# Patient Record
Sex: FEMALE | Race: WHITE | NOT HISPANIC OR LATINO | Employment: FULL TIME | URBAN - METROPOLITAN AREA
[De-identification: names, ages, dates, MRNs, and addresses within clinical notes are randomized per-mention and may not be internally consistent; named-entity substitution may affect disease eponyms.]

---

## 2020-02-23 ENCOUNTER — OFFICE VISIT (OUTPATIENT)
Dept: URGENT CARE | Facility: CLINIC | Age: 25
End: 2020-02-23
Payer: COMMERCIAL

## 2020-02-23 VITALS
RESPIRATION RATE: 18 BRPM | BODY MASS INDEX: 27.93 KG/M2 | HEIGHT: 63 IN | WEIGHT: 157.6 LBS | TEMPERATURE: 98.7 F | HEART RATE: 75 BPM | DIASTOLIC BLOOD PRESSURE: 70 MMHG | OXYGEN SATURATION: 98 % | SYSTOLIC BLOOD PRESSURE: 124 MMHG

## 2020-02-23 DIAGNOSIS — J02.9 SORE THROAT: ICD-10-CM

## 2020-02-23 DIAGNOSIS — J06.9 VIRAL UPPER RESPIRATORY TRACT INFECTION: Primary | ICD-10-CM

## 2020-02-23 LAB — S PYO AG THROAT QL: NEGATIVE

## 2020-02-23 PROCEDURE — 87880 STREP A ASSAY W/OPTIC: CPT | Performed by: PHYSICIAN ASSISTANT

## 2020-02-23 PROCEDURE — 87070 CULTURE OTHR SPECIMN AEROBIC: CPT | Performed by: PHYSICIAN ASSISTANT

## 2020-02-23 PROCEDURE — 99213 OFFICE O/P EST LOW 20 MIN: CPT | Performed by: PHYSICIAN ASSISTANT

## 2020-02-23 NOTE — PATIENT INSTRUCTIONS
Viral upper respiratory infection with PND causing sore throat and cough  Recommend sudafed for cough and PND  Rest, fluids and supportive care  May benefit from a cool mist humidifier on night stand  Tylenol/ibuprofen as needed for pain/fever  Follow up with PCP in 3-5 days  Proceed to  ER if symptoms worsen  Upper Respiratory Infection   WHAT YOU NEED TO KNOW:   An upper respiratory infection is also called the common cold  It is an infection that can affect your nose, throat, ears, and sinuses  For healthy people, the common cold is usually not serious and does not need special treatment  Cold symptoms are usually worst for the first 3 to 5 days  Most people get better in 7 to 14 days  You may continue to cough for 2 to 3 weeks  Colds are caused by viruses and do not get better with antibiotics  DISCHARGE INSTRUCTIONS:   Return to the emergency department if:   · You have chest pain or trouble breathing  Contact your healthcare provider if:   · You have a fever over 102ºF (39°C)  · Your sore throat gets worse or you see white or yellow spots in your throat  · Your symptoms get worse after 3 to 5 days or your cold is not better in 14 days  · You have a rash anywhere on your skin  · You have large, tender lumps in your neck  · You have thick, green or yellow drainage from your nose  · You cough up thick yellow, green, or bloody mucus  · You have vomiting for more than 24 hours and cannot keep fluids down  · You have a bad earache  · You have questions or concerns about your condition or care  Medicines: You may need any of the following:  · Decongestants  help reduce nasal congestion and help you breathe more easily  If you take decongestant pills, they may make you feel restless or cause problems with your sleep  Do not use decongestant sprays for more than a few days  · Cough suppressants  help reduce coughing   Ask your healthcare provider which type of cough medicine is best for you  · NSAIDs , such as ibuprofen, help decrease swelling, pain, and fever  NSAIDs can cause stomach bleeding or kidney problems in certain people  If you take blood thinner medicine, always ask your healthcare provider if NSAIDs are safe for you  Always read the medicine label and follow directions  · Acetaminophen  decreases pain and fever  It is available without a doctor's order  Ask how much to take and how often to take it  Follow directions  Read the labels of all other medicines you are using to see if they also contain acetaminophen, or ask your doctor or pharmacist  Acetaminophen can cause liver damage if not taken correctly  Do not use more than 4 grams (4,000 milligrams) total of acetaminophen in one day  · Take your medicine as directed  Contact your healthcare provider if you think your medicine is not helping or if you have side effects  Tell him or her if you are allergic to any medicine  Keep a list of the medicines, vitamins, and herbs you take  Include the amounts, and when and why you take them  Bring the list or the pill bottles to follow-up visits  Carry your medicine list with you in case of an emergency  Follow up with your healthcare provider as directed:  Write down your questions so you remember to ask them during your visits  Self-care:   · Rest as much as possible  Slowly start to do more each day  · Drink more liquids as directed  Liquids will help thin and loosen mucus so you can cough it up  Liquids will also help prevent dehydration  Liquids that help prevent dehydration include water, fruit juice, and broth  Do not drink liquids that contain caffeine  Caffeine can increase your risk for dehydration  Ask your healthcare provider how much liquid to drink each day  · Soothe a sore throat  Gargle with warm salt water  This helps your sore throat feel better  Make salt water by dissolving ¼ teaspoon salt in 1 cup warm water   You may also suck on hard candy or throat lozenges  You may use a sore throat spray  · Use a humidifier or vaporizer  Use a cool mist humidifier or a vaporizer to increase air moisture in your home  This may make it easier for you to breathe and help decrease your cough  · Use saline nasal drops as directed  These help relieve congestion  · Apply petroleum-based jelly around the outside of your nostrils  This can decrease irritation from blowing your nose  · Do not smoke  Nicotine and other chemicals in cigarettes and cigars can make your symptoms worse  They can also cause infections such as bronchitis or pneumonia  Ask your healthcare provider for information if you currently smoke and need help to quit  E-cigarettes or smokeless tobacco still contain nicotine  Talk to your healthcare provider before you use these products  Prevent spreading your cold to others:   · Try to stay away from other people during the first 2 to 3 days of your cold when it is more easily spread  · Do not share food or drinks  · Do not share hand towels with household members  · Wash your hands often, especially after you blow your nose  Turn away from other people and cover your mouth and nose with a tissue when you sneeze or cough  © 2017 [facility] Information is for End User's use only and may not be sold, redistributed or otherwise used for commercial purposes  All illustrations and images included in CareNotes® are the copyrighted property of A [employer]  or [employer]  The above information is an  only  It is not intended as medical advice for individual conditions or treatments  Talk to your doctor, nurse or pharmacist before following any medical regimen to see if it is safe and effective for you

## 2020-02-23 NOTE — PROGRESS NOTES
[facility]    NAME: Jl Herron is a 22 y o  female  : 1995    MRN: [id number]  DATE: 2020  TIME: 8:23 PM    Assessment and Plan   Viral upper respiratory tract infection [J06 9]  1  Viral upper respiratory tract infection     2  Sore throat  POCT rapid strepA    Throat culture         Patient Instructions   Viral upper respiratory infection with PND causing sore throat and cough  Recommend sudafed for cough and PND  Rest, fluids and supportive care  May benefit from a cool mist humidifier on night stand  Tylenol/ibuprofen as needed for pain/fever  Follow up with PCP in 3-5 days  Proceed to  ER if symptoms worsen  Chief Complaint     Chief Complaint   Patient presents with   Camille Sang Like Symptoms     Started Wednesday with sore throat and swollen glands  Today has PND with nasal congestion and occ  congested cough  No fever, ear pain  Took  OTC cough med today   Sore Throat         History of Present Illness       Sae Pimentel is a 27-year-old female who presents to the clinic complaining of sore throat x5 days  She states that has progressively worsened over those 5 days  She is also complaining of nasal congestion a dry the cough  She denies any fever, chills, ear pain, nausea, vomiting, sinus pain or pressure, shortness of breath, or chest pain  She has not taken any over-the-counter medication and denies any recent sick contacts  Review of Systems   Review of Systems   Constitutional: Negative for chills and fever  HENT: Positive for congestion and sore throat  Negative for ear pain, sinus pressure and sinus pain  Respiratory: Positive for cough  Negative for chest tightness and shortness of breath  Cardiovascular: Negative for chest pain  Gastrointestinal: Negative for nausea and vomiting  Musculoskeletal: Negative for myalgias  Neurological: Negative for headaches       Current Medications       Current Outpatient Medications:     Levonorgestrel-Ethinyl Estrad (LARISSIA PO), Take by mouth daily, Disp: , Rfl:     Current Allergies     Allergies as of 02/23/2020    (No Known Allergies)            The following portions of the patient's history were reviewed and updated as appropriate: allergies, current medications, past family history, past medical history, past social history, past surgical history and problem list      Past Medical History:   Diagnosis Date    No known health problems        Past Surgical History:   Procedure Laterality Date    LAPAROSCOPY      WISDOM TOOTH EXTRACTION         History reviewed  No pertinent family history  Medications have been verified  Objective   /70   Pulse 75   Temp 98 7 °F (37 1 °C)   Resp 18   Ht 5' 3" (1 6 m)   Wt 71 5 kg (157 lb 9 6 oz)   LMP 02/04/2020 (Exact Date)   SpO2 98%   BMI 27 92 kg/m²        Physical Exam     Physical Exam   Constitutional: She is oriented to person, place, and time  She appears well-developed and well-nourished  No distress  HENT:   Right Ear: Tympanic membrane, external ear and ear canal normal    Left Ear: Tympanic membrane, external ear and ear canal normal    Nose: Mucosal edema present  Right sinus exhibits no maxillary sinus tenderness and no frontal sinus tenderness  Left sinus exhibits no maxillary sinus tenderness and no frontal sinus tenderness  Mouth/Throat: Uvula is midline and mucous membranes are normal  Posterior oropharyngeal erythema present  No oropharyngeal exudate or posterior oropharyngeal edema  Cardiovascular: Normal rate, regular rhythm and normal heart sounds  Pulmonary/Chest: Effort normal and breath sounds normal  No stridor  She has no wheezes  She has no rales  Lymphadenopathy:     She has no cervical adenopathy  Neurological: She is alert and oriented to person, place, and time  Psychiatric: She has a normal mood and affect  Nursing note and vitals reviewed

## 2020-02-25 LAB — BACTERIA THROAT CULT: NORMAL

## 2020-07-10 ENCOUNTER — OFFICE VISIT (OUTPATIENT)
Dept: GASTROENTEROLOGY | Facility: CLINIC | Age: 25
End: 2020-07-10
Payer: COMMERCIAL

## 2020-07-10 VITALS — BODY MASS INDEX: 28.88 KG/M2 | HEIGHT: 63 IN | TEMPERATURE: 99.9 F | HEART RATE: 90 BPM | WEIGHT: 163 LBS

## 2020-07-10 DIAGNOSIS — R10.30 LOWER ABDOMINAL PAIN: Primary | ICD-10-CM

## 2020-07-10 DIAGNOSIS — R19.7 DIARRHEA, UNSPECIFIED TYPE: ICD-10-CM

## 2020-07-10 PROCEDURE — 99203 OFFICE O/P NEW LOW 30 MIN: CPT | Performed by: INTERNAL MEDICINE

## 2020-07-10 NOTE — LETTER
July 10, 2020     Referral [address] [place] [id number]    Patient: Dipti Bradford   YOB: 1995   Date of Visit: 7/10/2020       Dear Dr Wendie Bianchi:    Thank you for referring Dipti Bradford to me for evaluation  Below are my notes for this consultation  If you have questions, please do not hesitate to call me  I look forward to following your patient along with you  Sincerely,        Katrina Joseph MD        CC: SILVINA Martinez MD  7/10/2020  1:38 PM  Addendum  [facility] [facility] - Outpatient Consultation  Dipti Bradford 22 y o  female MRN: [id number]  Encounter: [id number]          ASSESSMENT AND PLAN:      1  Abdominal pain  - may be due to IBS (irritable bowel syndrome) vs visceral hypersensitivity vs endometriosis  - start IBgard (coated peppermint oil) 1-2 capsule daily  - avoid NSAIDs  - we will request records from [facility]    2  Diarrhea  - check blood and stool tests (CBC, CMP, TSH, Celiac ab profile, AM cortisol, CRP, stool enteric panel, c diff, O&P, giardia ab, fecal calprotectin)  - start benefiber daily   - start loperamide or immodium 2 mg tab as needed up to four times per day  Can use as prevention    Follow up in 3 months    ____________________________________________________________________  HPI:  Ms Maryellen Thornton is a 21 yo W who presents for chronic abd pain     Co-morbidities:     Abdominal pain  - began May 2019  - lower, suprapubic pain  - intermittent, lasts a long time (2-6 weeks)  - has pain about 75% of the time  - crampy, similar to menstrual cramps  - extensive work-up: pelvic US, laparoscopy to evaluate for endometrisosis (neg), colonoscopy, CT scans  - has tried dicyclomine, hyoscyamine, chlordiazepoxide, linzess  - +bloating  - no nausea, vomiting, early satiety  - not worse with meals or better with BM  - denies NSAIDs, steroids, sugarfree products    Bowel habits  - use to have 1 BM every 3 days  - BSS 3-4  - denies incomplete evacuation  - denies straining    Diarrhea  - began this week  - up to 8 BMs per day  - loose stools (BSS 5-6)  - worse when eating  - no night time episodes, no fecal incontinence  - no blood in stool  - no camping, travel, antibiotics, sick contacts, well water    Fatigue  - does work nights now    Intentional wt loss of 15 lbs over 5 months  ROS: No abd pain, nausea, vomiting, heartburn, acid reflux, constipation, odynophagia, dysphagia, hematemesis, melena, hematochezia, early satiety, appetite changes  REVIEW OF SYSTEMS:    CONSTITUTIONAL: Denies any fever, chills, rigors, and weight loss  HEENT: No earache or tinnitus  Denies hearing loss or visual disturbances  CARDIOVASCULAR: No chest pain or palpitations  RESPIRATORY: Denies any cough, hemoptysis, shortness of breath or dyspnea on exertion  GASTROINTESTINAL: As noted in the History of Present Illness  GENITOURINARY: No problems with urination  Denies any hematuria or dysuria  NEUROLOGIC: No dizziness or vertigo, denies headaches  MUSCULOSKELETAL: Denies any muscle or joint pain  SKIN: Denies skin rashes or itching  ENDOCRINE: Denies excessive thirst  Denies intolerance to heat or cold  PSYCHOSOCIAL: Denies depression or anxiety  Denies any recent memory loss         Historical Information   Past Medical History:   Diagnosis Date    No known health problems      Past Surgical History:   Procedure Laterality Date    COLONOSCOPY  02/2020    LAPAROSCOPY      WISDOM TOOTH EXTRACTION       Social History   Social History     Substance and Sexual Activity   Alcohol Use Yes    Comment: occ     Social History     Substance and Sexual Activity   Drug Use Never     Social History     Tobacco Use   Smoking Status Never Smoker   Smokeless Tobacco Never Used     Family History   Problem Relation Age of Onset    Breast cancer Family    No GI cancers    Meds/Allergies Current Outpatient Medications:     Levonorgestrel-Ethinyl Estrad (LARISSIA PO)    No Known Allergies        Objective     Pulse 90, temperature 99 9 °F (37 7 °C), height 5' 3" (1 6 m), weight 73 9 kg (163 lb)  Body mass index is 28 87 kg/m²  PHYSICAL EXAM:      General Appearance:   Alert, cooperative, no distress   HEENT:   Normocephalic, atraumatic, anicteric  Neck:  Supple, symmetrical, trachea midline   Lungs:   Clear to auscultation bilaterally; no rales, rhonchi or wheezing; respirations unlabored    Heart[de-identified]   Regular rate and rhythm; no murmur, rub, or gallop  Abdomen:   Soft, non-tender, non-distended; normal bowel sounds; no masses, no organomegaly    Rectal:   Deferred   Neuro:   Alert, oriented, no gross deficits, normal strength and tone   Extremities:  No cyanosis, clubbing or edema    Psych:  Normal mood and affect    Skin:  No jaundice, rashes, or lesions    Lymph nodes:  No palpable cervical lymphadenopathy        Lab Results:   No visits with results within 1 Day(s) from this visit  Latest known visit with results is:   Office Visit on 02/23/2020   Component Date Value     RAPID STREP A 02/23/2020 Negative     Throat Culture 02/23/2020 Negative for beta-hemolytic Streptococcus          Radiology Results:   No results found      Endoscopies:

## 2020-07-10 NOTE — PATIENT INSTRUCTIONS
Abdominal pain  - may be due to IBS (irritable bowel syndrome) vs visceral hypersensitivity vs endometriosis  - start IBgard (coated peppermint oil) 1-2 capsule daily  - avoid NSAIDs  - we will request records from [facility]    Diarrhea  - check blood and stool tests (CBC, CMP, TSH, Celiac ab profile, CRP, AM cortisol, stool enteric panel, c diff, O&P, giardia ab, fecal calprotectin)  - start benefiber daily   - start loperamide or immodium 2 mg tab as needed up to four times per day   Can use as prevention    Follow up in 3 months

## 2020-07-10 NOTE — PROGRESS NOTES
[facility] - Outpatient Consultation  Stas Bertrand 22 y o  female MRN: [id number]  Encounter: [id number]          ASSESSMENT AND PLAN:      1  Abdominal pain  - may be due to IBS (irritable bowel syndrome) vs visceral hypersensitivity vs endometriosis  - start IBgard (coated peppermint oil) 1-2 capsule daily  - avoid NSAIDs  - we will request records from [facility]    2  Diarrhea  - check blood and stool tests (CBC, CMP, TSH, Celiac ab profile, AM cortisol, CRP, stool enteric panel, c diff, O&P, giardia ab, fecal calprotectin)  - start benefiber daily   - start loperamide or immodium 2 mg tab as needed up to four times per day  Can use as prevention    Follow up in 3 months    ____________________________________________________________________  HPI:  Ms Nigel Ceballos is a 23 yo W who presents for chronic abd pain  Co-morbidities:     Abdominal pain  - began May 2019  - lower, suprapubic pain  - intermittent, lasts a long time (2-6 weeks)  - has pain about 75% of the time  - crampy, similar to menstrual cramps  - extensive work-up: pelvic US, laparoscopy to evaluate for endometrisosis (neg), colonoscopy, CT scans  - has tried dicyclomine, hyoscyamine, chlordiazepoxide, linzess  - +bloating  - no nausea, vomiting, early satiety  - not worse with meals or better with BM  - denies NSAIDs, steroids, sugarfree products    Bowel habits  - use to have 1 BM every 3 days  - BSS 3-4  - denies incomplete evacuation  - denies straining    Diarrhea  - began this week  - up to 8 BMs per day  - loose stools (BSS 5-6)  - worse when eating  - no night time episodes, no fecal incontinence  - no blood in stool  - no camping, travel, antibiotics, sick contacts, well water    Fatigue  - does work nights now    Intentional wt loss of 15 lbs over 5 months       ROS: No abd pain, nausea, vomiting, heartburn, acid reflux, constipation, odynophagia, dysphagia, hematemesis, melena, hematochezia, early satiety, appetite changes  REVIEW OF SYSTEMS:    CONSTITUTIONAL: Denies any fever, chills, rigors, and weight loss  HEENT: No earache or tinnitus  Denies hearing loss or visual disturbances  CARDIOVASCULAR: No chest pain or palpitations  RESPIRATORY: Denies any cough, hemoptysis, shortness of breath or dyspnea on exertion  GASTROINTESTINAL: As noted in the History of Present Illness  GENITOURINARY: No problems with urination  Denies any hematuria or dysuria  NEUROLOGIC: No dizziness or vertigo, denies headaches  MUSCULOSKELETAL: Denies any muscle or joint pain  SKIN: Denies skin rashes or itching  ENDOCRINE: Denies excessive thirst  Denies intolerance to heat or cold  PSYCHOSOCIAL: Denies depression or anxiety  Denies any recent memory loss  Historical Information   Past Medical History:   Diagnosis Date    No known health problems      Past Surgical History:   Procedure Laterality Date    COLONOSCOPY  02/2020    LAPAROSCOPY      WISDOM TOOTH EXTRACTION       Social History   Social History     Substance and Sexual Activity   Alcohol Use Yes    Comment: occ     Social History     Substance and Sexual Activity   Drug Use Never     Social History     Tobacco Use   Smoking Status Never Smoker   Smokeless Tobacco Never Used     Family History   Problem Relation Age of Onset    Breast cancer Family    No GI cancers    Meds/Allergies       Current Outpatient Medications:     Levonorgestrel-Ethinyl Estrad (LARISSIA PO)    No Known Allergies        Objective     Pulse 90, temperature 99 9 °F (37 7 °C), height 5' 3" (1 6 m), weight 73 9 kg (163 lb)  Body mass index is 28 87 kg/m²  PHYSICAL EXAM:      General Appearance:   Alert, cooperative, no distress   HEENT:   Normocephalic, atraumatic, anicteric       Neck:  Supple, symmetrical, trachea midline   Lungs:   Clear to auscultation bilaterally; no rales, rhonchi or wheezing; respirations unlabored    Heart[de-identified] Regular rate and rhythm; no murmur, rub, or gallop  Abdomen:   Soft, non-tender, non-distended; normal bowel sounds; no masses, no organomegaly    Rectal:   Deferred   Neuro:   Alert, oriented, no gross deficits, normal strength and tone   Extremities:  No cyanosis, clubbing or edema    Psych:  Normal mood and affect    Skin:  No jaundice, rashes, or lesions    Lymph nodes:  No palpable cervical lymphadenopathy        Lab Results:   No visits with results within 1 Day(s) from this visit  Latest known visit with results is:   Office Visit on 02/23/2020   Component Date Value     RAPID STREP A 02/23/2020 Negative     Throat Culture 02/23/2020 Negative for beta-hemolytic Streptococcus          Radiology Results:   No results found      Endoscopies:

## 2020-07-16 ENCOUNTER — TELEPHONE (OUTPATIENT)
Dept: OTHER | Facility: OTHER | Age: 25
End: 2020-07-16

## 2020-07-16 ENCOUNTER — APPOINTMENT (OUTPATIENT)
Dept: LAB | Facility: CLINIC | Age: 25
End: 2020-07-16
Payer: COMMERCIAL

## 2020-07-16 DIAGNOSIS — R19.7 DIARRHEA, UNSPECIFIED TYPE: ICD-10-CM

## 2020-07-16 LAB
ALBUMIN SERPL BCP-MCNC: 4.1 G/DL (ref 3.5–5)
ALP SERPL-CCNC: 47 U/L (ref 46–116)
ALT SERPL W P-5'-P-CCNC: 27 U/L (ref 12–78)
ANION GAP SERPL CALCULATED.3IONS-SCNC: 6 MMOL/L (ref 4–13)
AST SERPL W P-5'-P-CCNC: 13 U/L (ref 5–45)
BASOPHILS # BLD AUTO: 0.04 THOUSANDS/ΜL (ref 0–0.1)
BASOPHILS NFR BLD AUTO: 1 % (ref 0–1)
BILIRUB SERPL-MCNC: 1.64 MG/DL (ref 0.2–1)
BUN SERPL-MCNC: 14 MG/DL (ref 5–25)
CALCIUM SERPL-MCNC: 9.2 MG/DL (ref 8.3–10.1)
CHLORIDE SERPL-SCNC: 108 MMOL/L (ref 100–108)
CO2 SERPL-SCNC: 25 MMOL/L (ref 21–32)
CREAT SERPL-MCNC: 0.84 MG/DL (ref 0.6–1.3)
CRP SERPL QL: 3.6 MG/L
EOSINOPHIL # BLD AUTO: 0.07 THOUSAND/ΜL (ref 0–0.61)
EOSINOPHIL NFR BLD AUTO: 2 % (ref 0–6)
ERYTHROCYTE [DISTWIDTH] IN BLOOD BY AUTOMATED COUNT: 11.4 % (ref 11.6–15.1)
GFR SERPL CREATININE-BSD FRML MDRD: 97 ML/MIN/1.73SQ M
GLUCOSE P FAST SERPL-MCNC: 83 MG/DL (ref 65–99)
HCT VFR BLD AUTO: 43.4 % (ref 34.8–46.1)
HGB BLD-MCNC: 15.2 G/DL (ref 11.5–15.4)
IMM GRANULOCYTES # BLD AUTO: 0 THOUSAND/UL (ref 0–0.2)
IMM GRANULOCYTES NFR BLD AUTO: 0 % (ref 0–2)
LYMPHOCYTES # BLD AUTO: 1.85 THOUSANDS/ΜL (ref 0.6–4.47)
LYMPHOCYTES NFR BLD AUTO: 39 % (ref 14–44)
MCH RBC QN AUTO: 31.9 PG (ref 26.8–34.3)
MCHC RBC AUTO-ENTMCNC: 35 G/DL (ref 31.4–37.4)
MCV RBC AUTO: 91 FL (ref 82–98)
MONOCYTES # BLD AUTO: 0.33 THOUSAND/ΜL (ref 0.17–1.22)
MONOCYTES NFR BLD AUTO: 7 % (ref 4–12)
NEUTROPHILS # BLD AUTO: 2.5 THOUSANDS/ΜL (ref 1.85–7.62)
NEUTS SEG NFR BLD AUTO: 51 % (ref 43–75)
NRBC BLD AUTO-RTO: 0 /100 WBCS
PLATELET # BLD AUTO: 236 THOUSANDS/UL (ref 149–390)
PMV BLD AUTO: 9.8 FL (ref 8.9–12.7)
POTASSIUM SERPL-SCNC: 4.2 MMOL/L (ref 3.5–5.3)
PROT SERPL-MCNC: 7.3 G/DL (ref 6.4–8.2)
RBC # BLD AUTO: 4.76 MILLION/UL (ref 3.81–5.12)
SODIUM SERPL-SCNC: 139 MMOL/L (ref 136–145)
T4 FREE SERPL-MCNC: 1.12 NG/DL (ref 0.76–1.46)
TSH SERPL DL<=0.05 MIU/L-ACNC: 1.79 UIU/ML (ref 0.36–3.74)
WBC # BLD AUTO: 4.79 THOUSAND/UL (ref 4.31–10.16)

## 2020-07-16 PROCEDURE — 87493 C DIFF AMPLIFIED PROBE: CPT

## 2020-07-16 PROCEDURE — 80053 COMPREHEN METABOLIC PANEL: CPT

## 2020-07-16 PROCEDURE — 87329 GIARDIA AG IA: CPT

## 2020-07-16 PROCEDURE — 36415 COLL VENOUS BLD VENIPUNCTURE: CPT

## 2020-07-16 PROCEDURE — 82784 ASSAY IGA/IGD/IGG/IGM EACH: CPT

## 2020-07-16 PROCEDURE — 86140 C-REACTIVE PROTEIN: CPT

## 2020-07-16 PROCEDURE — 84443 ASSAY THYROID STIM HORMONE: CPT

## 2020-07-16 PROCEDURE — 87177 OVA AND PARASITES SMEARS: CPT

## 2020-07-16 PROCEDURE — 83516 IMMUNOASSAY NONANTIBODY: CPT

## 2020-07-16 PROCEDURE — 86255 FLUORESCENT ANTIBODY SCREEN: CPT

## 2020-07-16 PROCEDURE — 84439 ASSAY OF FREE THYROXINE: CPT

## 2020-07-16 PROCEDURE — 85025 COMPLETE CBC W/AUTO DIFF WBC: CPT

## 2020-07-16 PROCEDURE — 87209 SMEAR COMPLEX STAIN: CPT

## 2020-07-16 PROCEDURE — 83993 ASSAY FOR CALPROTECTIN FECAL: CPT

## 2020-07-16 NOTE — TELEPHONE ENCOUNTER
Spoke with patient  She is aware of CBC results  She knows we will call her with the rest of the results once they come back  She states she was unable to complete stool enteric bacterial panel as they ran out of specimen cups at the lab, she will also need to go back to complete cortisol level as they needed an AM specimen  She had one BM since we spoke this morning that was black, not experiencing any alarm symptoms

## 2020-07-16 NOTE — TELEPHONE ENCOUNTER
FYI:    Dr Zoey Gutierrez patient hx lower abdominal pain, diarrhea    Patient states she was having diarrhea all last week and it subsided  Last night it came back and she had 2 episodes of black diarrhea  Has not had a BM since last night  Denies fever, chills, N/V, weakness, dizziness, fatigue  Experiencing lower abdominal cramping which she states is not new  She denies using pepto bismol or iron supplements  She was just seen on 7/10/20 in the office and Dr Zoey Gutierrez ordered blood work and stool testing for patient to complete (10 labs including CBC)  I advised patient complete these tests today  We discussed alarm symptoms and she knows to go to ED if she has another black stool or fever, chills, N/V, fatigue, dizziness  She will complete blood work and stool testing today

## 2020-07-16 NOTE — TELEPHONE ENCOUNTER
Thank you  Most of the lab work is still pending, but I see that the CBC has returned and her hemoglobin is above 15, which is good news, can advise patient of this and that we will let her know as more of the labs return    Thank you

## 2020-07-16 NOTE — TELEPHONE ENCOUNTER
Pt states that her stool now has blood and its has changed colors   She wants to know if this is normal     Pt requested for message to be sent to provider in the morning

## 2020-07-17 LAB — C DIFF TOX GENS STL QL NAA+PROBE: NEGATIVE

## 2020-07-18 LAB
ENDOMYSIUM IGA SER QL: NEGATIVE
GLIADIN PEPTIDE IGA SER-ACNC: 3 UNITS (ref 0–19)
GLIADIN PEPTIDE IGG SER-ACNC: 2 UNITS (ref 0–19)
IGA SERPL-MCNC: 142 MG/DL (ref 87–352)
TTG IGA SER-ACNC: <2 U/ML (ref 0–3)
TTG IGG SER-ACNC: <2 U/ML (ref 0–5)

## 2020-07-21 ENCOUNTER — APPOINTMENT (OUTPATIENT)
Dept: LAB | Facility: CLINIC | Age: 25
End: 2020-07-21
Payer: COMMERCIAL

## 2020-07-21 ENCOUNTER — TRANSCRIBE ORDERS (OUTPATIENT)
Dept: LAB | Facility: CLINIC | Age: 25
End: 2020-07-21

## 2020-07-21 DIAGNOSIS — R19.7 DIARRHEA OF PRESUMED INFECTIOUS ORIGIN: Primary | ICD-10-CM

## 2020-07-21 LAB
CALPROTECTIN STL-MCNT: <16 UG/G (ref 0–120)
CORTIS AM PEAK SERPL-MCNC: 23.1 UG/DL (ref 4.2–22.4)
O+P STL CONC: NORMAL

## 2020-07-21 PROCEDURE — 82533 TOTAL CORTISOL: CPT

## 2020-07-24 LAB
G LAMBLIA AG STL QL IA: NEGATIVE
O+P STL CONC: NORMAL

## 2020-08-10 DIAGNOSIS — R17 ELEVATED BILIRUBIN: Primary | ICD-10-CM

## 2020-08-21 ENCOUNTER — APPOINTMENT (OUTPATIENT)
Dept: LAB | Facility: CLINIC | Age: 25
End: 2020-08-21
Payer: COMMERCIAL

## 2020-08-21 DIAGNOSIS — R10.30 LOWER ABDOMINAL PAIN: Primary | ICD-10-CM

## 2020-08-21 DIAGNOSIS — R19.7 DIARRHEA, UNSPECIFIED TYPE: ICD-10-CM

## 2020-08-21 DIAGNOSIS — R17 ELEVATED BILIRUBIN: ICD-10-CM

## 2020-08-21 LAB — BILIRUB DIRECT SERPL-MCNC: 0.27 MG/DL (ref 0–0.2)

## 2020-08-21 PROCEDURE — 87505 NFCT AGENT DETECTION GI: CPT

## 2020-08-21 PROCEDURE — 82248 BILIRUBIN DIRECT: CPT

## 2020-08-21 PROCEDURE — 36415 COLL VENOUS BLD VENIPUNCTURE: CPT

## 2020-08-21 RX ORDER — DICYCLOMINE HCL 20 MG
20 TABLET ORAL EVERY 6 HOURS
Qty: 120 TABLET | Refills: 3 | Status: SHIPPED | OUTPATIENT
Start: 2020-08-21 | End: 2020-12-21

## 2020-08-21 NOTE — TELEPHONE ENCOUNTER
patient has been having severe stomach pains and is going to the bathroom constant with loose stools  can one of the nurses please call patient to discuss about the severe stomach pains and to see if she needs to be taking something for the pain

## 2020-08-21 NOTE — TELEPHONE ENCOUNTER
Patient aware bentyl sent to pharmacy, educated on same  She said she also requested path report from colonoscopy and had bw and stool study done  We will call her with results

## 2020-08-21 NOTE — TELEPHONE ENCOUNTER
23 yo seen in office July 10 for low abdominal pain and cramps/diarrhea  Colonoscopy procedure report from   [facility] Feb 20 shows polypectomy,hemorrhoid otherwise unremarkable; no path report available  Cdiff O & P,calprotectin, thyroid study,celiac WNL  Enteric panel not collected  Tot BR 1 64  CT 5/21/19 showed small hiatal hernia, constipation  She called to report nausea, loss of appetite, stomach feels "warm", low generalized abdominal pain "4/10" and diarrhea 7x/day past few days  Denies bleeding, fever  She said she is taking IB guard and benefiber  I suggested she take imodium (she has not tried) per package directions as needed for diarrhea, get enteric panel done as well as bw (biirubin total and direct)  Also requested she call [facility] to get path report sent  She has f/u 9/29  Please provide recommendation; do you think bentyl would help? Thank you

## 2020-08-21 NOTE — TELEPHONE ENCOUNTER
Yes! Let's send Bentyl and wait for the path   I think she would do well with Xifaxan as long as her path did not show nay other pathology

## 2020-08-22 LAB
CAMPYLOBACTER DNA SPEC NAA+PROBE: NORMAL
SALMONELLA DNA SPEC QL NAA+PROBE: NORMAL
SHIGA TOXIN STX GENE SPEC NAA+PROBE: NORMAL
SHIGELLA DNA SPEC QL NAA+PROBE: NORMAL

## 2020-08-24 ENCOUNTER — TELEPHONE (OUTPATIENT)
Dept: GASTROENTEROLOGY | Facility: AMBULARY SURGERY CENTER | Age: 25
End: 2020-08-24

## 2020-08-24 NOTE — TELEPHONE ENCOUNTER
----- Message from [facility], MD sent at 8/10/2020 10:45 PM EDT -----  Please let patient know that morning cortisol level was normal, celiac test negative, stool tests were negative, stool marker of inflammation normal, kidney tests normal, thyroid tests normal, and blood count normal   Her liver tests were normal except for bilirubin mildly elevated and CRP open marker inflation of blood) borderline elevated  Recommend checking a total and direct bilirubin level  She can get blood test done prior to next appointment with me      Thank you

## 2020-08-24 NOTE — TELEPHONE ENCOUNTER
Patient returned call went over lab results with her, patient is scheduled in Sept  For follow up appt  , will get labs done prior to appt

## 2020-09-09 ENCOUNTER — TELEPHONE (OUTPATIENT)
Dept: GASTROENTEROLOGY | Facility: AMBULARY SURGERY CENTER | Age: 25
End: 2020-09-09

## 2020-09-09 NOTE — TELEPHONE ENCOUNTER
Patients GI provider:  Dr Humberto Sanabria    Number to return call: (  [phone]    Reason for call: Pt calling with abd pain, meds not helping her    Scheduled procedure/appointment date if applicable: Apt/procedure 9-29-20

## 2020-09-09 NOTE — TELEPHONE ENCOUNTER
Patient of Dr Jazmyne Kramer, last seen 7/10    History of abdominal pain, diarrhea    I called patient, she states that at last office vist, IBgard, immodium, and benefiber were recommended  Patient states that she has been taking them religiously since then and at first they were helping her minimally but now she feels as though they are not working at all  She is also taking bentyl  She is still experiencing abdominal pain and diarrhea but she states she is used to the pain so that is not bothering her so much as the diarrhea  She states she is having almost 20 loose stools a day  Patient is also concerned because she states the last few days her stools were bright yellow and she knows her bilirubin is elevated   Please advise

## 2020-09-10 NOTE — TELEPHONE ENCOUNTER
Dr Lore Patino  Her pathology is scanned into the chart  Please review, if you do not think this is consistent with microscopic colitis then we will prescribe Xifaxan  Thank you!

## 2020-09-14 ENCOUNTER — TELEPHONE (OUTPATIENT)
Dept: GASTROENTEROLOGY | Facility: CLINIC | Age: 25
End: 2020-09-14

## 2020-09-14 DIAGNOSIS — K58.0 IRRITABLE BOWEL SYNDROME WITH DIARRHEA: Primary | ICD-10-CM

## 2020-09-14 NOTE — TELEPHONE ENCOUNTER
Please advise, upon my review of the pathology, this does not appear consistent with microscopic/lymphocytic colitis, she will likely benefit from 2-week course of Xifaxan, which I am sending now   Thank you

## 2020-09-15 ENCOUNTER — TELEPHONE (OUTPATIENT)
Dept: OTHER | Facility: OTHER | Age: 25
End: 2020-09-15

## 2020-09-15 NOTE — TELEPHONE ENCOUNTER
Reviewed OSH colonoscopy and pathology  Path is negative for microscopic colitis  Agree with starting treatment for small intestinal bacterial overgrowth with Rifaximin   (PA already ordered )

## 2020-09-16 DIAGNOSIS — K58.0 IRRITABLE BOWEL SYNDROME WITH DIARRHEA: ICD-10-CM

## 2020-09-24 ENCOUNTER — APPOINTMENT (OUTPATIENT)
Dept: LAB | Facility: CLINIC | Age: 25
End: 2020-09-24
Payer: COMMERCIAL

## 2020-09-24 ENCOUNTER — TELEPHONE (OUTPATIENT)
Dept: GASTROENTEROLOGY | Facility: CLINIC | Age: 25
End: 2020-09-24

## 2020-09-24 DIAGNOSIS — R17 ELEVATED BILIRUBIN: Primary | ICD-10-CM

## 2020-09-24 DIAGNOSIS — R17 ELEVATED BILIRUBIN: ICD-10-CM

## 2020-09-24 LAB
ALBUMIN SERPL BCP-MCNC: 4 G/DL (ref 3.5–5)
ALP SERPL-CCNC: 52 U/L (ref 46–116)
ALT SERPL W P-5'-P-CCNC: 28 U/L (ref 12–78)
AST SERPL W P-5'-P-CCNC: 12 U/L (ref 5–45)
BILIRUB DIRECT SERPL-MCNC: 0.2 MG/DL (ref 0–0.2)
BILIRUB SERPL-MCNC: 1.14 MG/DL (ref 0.2–1)
PROT SERPL-MCNC: 7.4 G/DL (ref 6.4–8.2)

## 2020-09-24 PROCEDURE — 36415 COLL VENOUS BLD VENIPUNCTURE: CPT

## 2020-09-24 PROCEDURE — 80076 HEPATIC FUNCTION PANEL: CPT

## 2020-09-24 NOTE — TELEPHONE ENCOUNTER
Patients GI provider:  Dr Luciana Andrews    Number to return call: NA    Reason for call: [facility] called stating pt is currently at the lab for bilirubin lab work but they are unable to see the order in the system  Please place new order in chart for labs to be drawn   also tiger text    Scheduled procedure/appointment date if applicable: Appt 98/56/04

## 2020-09-29 ENCOUNTER — OFFICE VISIT (OUTPATIENT)
Dept: GASTROENTEROLOGY | Facility: CLINIC | Age: 25
End: 2020-09-29
Payer: COMMERCIAL

## 2020-09-29 VITALS
WEIGHT: 160.4 LBS | HEART RATE: 80 BPM | BODY MASS INDEX: 28.42 KG/M2 | HEIGHT: 63 IN | RESPIRATION RATE: 18 BRPM | TEMPERATURE: 97.7 F

## 2020-09-29 DIAGNOSIS — R19.7 DIARRHEA, UNSPECIFIED TYPE: ICD-10-CM

## 2020-09-29 DIAGNOSIS — K63.89 SMALL INTESTINAL BACTERIAL OVERGROWTH: Primary | ICD-10-CM

## 2020-09-29 DIAGNOSIS — R10.30 LOWER ABDOMINAL PAIN: ICD-10-CM

## 2020-09-29 DIAGNOSIS — E80.4 GILBERT'S SYNDROME: ICD-10-CM

## 2020-09-29 PROCEDURE — 99213 OFFICE O/P EST LOW 20 MIN: CPT | Performed by: INTERNAL MEDICINE

## 2020-09-29 NOTE — PROGRESS NOTES
[facility] - Outpatient Consultation  Augusta Blackmon 22 y o  female MRN: [id number]  Encounter: [id number]          ASSESSMENT AND PLAN:      1  Abdominal pain  - most likely due to IBS (irritable bowel syndrome) vs visceral hypersensitivity vs endometriosis  - increase IBgard (coated peppermint oil) 2 capsule daily  - continue to avoid NSAIDs  - continue bentyl 20 mg as needed four times a day  - if abdominal pain and diarrhea not improving after antibiotics, start amitriptyline for IBS-D (will need EKG prior to starting)    2  Diarrhea  - most likely due to small intestinal bacterial overgrowth vs IBS-D  - start Rifaximin 550 three time per day for 14 days  - CBC, CMP, TSH, Celiac ab profile, AM cortisol, CRP, stool enteric panel, c diff, O&P, giardia ab, fecal calprotectin were normal (CRP was borderline elevated)  - continue loperamide or immodium 2 mg tab as needed up to four times per day and for prevention  - start probiotic or yogurt daily    3  Elevated bilirubin  - most likely due to Gilbert's syndrome  - elevated total bilirubin and normal direct bilirbuin    Follow up in 3-4 months    ____________________________________________________________________  HPI:  Ms Javier Yañez is a 21 yo W who presents for chronic abd pain  Co-morbidities:     Reviewed OSH colonoscopy and pathology  Path is negative for microscopic colitis  Rifaximin written for an episode of abd pain and diarrhea but not approved by insurance       Abdominal pain  - mildly improved recenlty  - began May 2019  - lower, suprapubic pain  - intermittent, lasts a long time (2-6 weeks)  - has pain about 75% of the time  - crampy, similar to menstrual cramps  - extensive work-up: pelvic US, laparoscopy to evaluate for endometrisosis (neg), colonoscopy, CT scans  - has tried dicyclomine, hyoscyamine, chlordiazepoxide, linzess  - +bloating  - no nausea, vomiting, early satiety  - not worse with meals or better with BM  - denies NSAIDs, steroids, sugarfree products  - started IBguard 2-3 tab daily and bentyl (almost daily before, now as needed) which have helped     Diarrhea  - almost daily  - up to 3-6 BMs per day  - loose stools (BSS 6-7)  - worse when eating  - no night time episodes, no fecal incontinence  - no blood in stool  - no camping, travel, antibiotics, steroids, sick contacts, well water  - started benefiber but not helping  - using imodium prn about 1x daily    Fatigue  - does work nights    Intentional wt loss of 15 lbs over 5 months  Wt stable since last visit  REVIEW OF SYSTEMS:    CONSTITUTIONAL: Denies any fever, chills, rigors, and weight loss  HEENT: No earache or tinnitus  Denies hearing loss or visual disturbances  CARDIOVASCULAR: No chest pain or palpitations  RESPIRATORY: Denies any cough, hemoptysis, shortness of breath or dyspnea on exertion  GASTROINTESTINAL: As noted in the History of Present Illness  GENITOURINARY: No problems with urination  Denies any hematuria or dysuria  NEUROLOGIC: No dizziness or vertigo, denies headaches  MUSCULOSKELETAL: Denies any muscle or joint pain  SKIN: Denies skin rashes or itching  ENDOCRINE: Denies excessive thirst  Denies intolerance to heat or cold  PSYCHOSOCIAL: Denies depression or anxiety  Denies any recent memory loss         Historical Information   Past Medical History:   Diagnosis Date    No known health problems      Past Surgical History:   Procedure Laterality Date    COLONOSCOPY  02/2020    LAPAROSCOPY      WISDOM TOOTH EXTRACTION       Social History   Social History     Substance and Sexual Activity   Alcohol Use Yes    Comment: occ     Social History     Substance and Sexual Activity   Drug Use Never     Social History     Tobacco Use   Smoking Status Never Smoker   Smokeless Tobacco Never Used     Family History   Problem Relation Age of Onset    Breast cancer Family    No GI cancers    Meds/Allergies       Current Outpatient Medications:     dicyclomine (BENTYL) 20 mg tablet    Levonorgestrel-Ethinyl Estrad (LARISSIA PO)    rifaximin (XIFAXAN) 550 mg tablet    No Known Allergies        Objective     Pulse 80, temperature 97 7 °F (36 5 °C), temperature source Temporal, resp  rate 18, height 5' 3" (1 6 m), weight 72 8 kg (160 lb 6 4 oz)  Body mass index is 28 41 kg/m²  PHYSICAL EXAM:      General Appearance:   Alert, cooperative, no distress   HEENT:   Normocephalic, atraumatic, anicteric  Neck:  Supple, symmetrical, trachea midline   Lungs:   Clear to auscultation bilaterally; no rales, rhonchi or wheezing; respirations unlabored    Heart[de-identified]   Regular rate and rhythm; no murmur, rub, or gallop  Abdomen:   Soft, non-tender, non-distended; normal bowel sounds; no masses, no organomegaly    Rectal:   Deferred   Neuro:   Alert, oriented, no gross deficits, normal strength and tone   Extremities:  No cyanosis, clubbing or edema    Psych:  Normal mood and affect    Skin:  No jaundice, rashes, or lesions    Lymph nodes:  No palpable cervical lymphadenopathy        Lab Results:   No visits with results within 1 Day(s) from this visit  Latest known visit with results is:   Appointment on 09/24/2020   Component Date Value    Total Bilirubin 09/24/2020 1 14*    Bilirubin, Direct 09/24/2020 0 20     Alkaline Phosphatase 09/24/2020 52     AST 09/24/2020 12     ALT 09/24/2020 28     Total Protein 09/24/2020 7 4     Albumin 09/24/2020 4 0          Radiology Results:   No results found      Endoscopies:

## 2020-09-29 NOTE — PATIENT INSTRUCTIONS
1  Abdominal pain  - most likely due to IBS (irritable bowel syndrome) vs visceral hypersensitivity vs endometriosis  - increase IBgard (coated peppermint oil) 2 capsule daily  - continue to avoid NSAIDs  - continue bentyl 20 mg as needed four times a day  - if abdominal pain and diarrhea not improving after antibiotics, start amitriptyline for IBS-D (will need EKG prior to starting)    2  Diarrhea  - most likely due to small intestinal bacterial overgrowth vs IBS-D  - start Rifaximin 550 three time per day for 14 days  - blood and stool tests were normal  - continue loperamide or immodium 2 mg tab as needed up to four times per day  Can use as prevention by taking before leaving house    - starting probiotic (Align or Florstar) or start yogurt daily    Follow up in 3-4 months

## 2020-10-06 ENCOUNTER — TELEPHONE (OUTPATIENT)
Dept: GASTROENTEROLOGY | Facility: CLINIC | Age: 25
End: 2020-10-06

## 2020-12-21 DIAGNOSIS — R19.7 DIARRHEA, UNSPECIFIED TYPE: ICD-10-CM

## 2020-12-21 DIAGNOSIS — R10.30 LOWER ABDOMINAL PAIN: ICD-10-CM

## 2020-12-21 RX ORDER — DICYCLOMINE HCL 20 MG
20 TABLET ORAL EVERY 6 HOURS
Qty: 360 TABLET | Refills: 1 | Status: SHIPPED | OUTPATIENT
Start: 2020-12-21